# Patient Record
Sex: MALE | Race: WHITE | NOT HISPANIC OR LATINO | Employment: FULL TIME | URBAN - NONMETROPOLITAN AREA
[De-identification: names, ages, dates, MRNs, and addresses within clinical notes are randomized per-mention and may not be internally consistent; named-entity substitution may affect disease eponyms.]

---

## 2023-12-21 ENCOUNTER — HOSPITAL ENCOUNTER (EMERGENCY)
Facility: HOSPITAL | Age: 58
Discharge: ANOTHER HOSPITAL | End: 2023-12-22
Attending: EMERGENCY MEDICINE
Payer: COMMERCIAL

## 2023-12-21 ENCOUNTER — APPOINTMENT (EMERGENCY)
Dept: MRI IMAGING | Facility: HOSPITAL | Age: 58
End: 2023-12-21
Payer: COMMERCIAL

## 2023-12-21 ENCOUNTER — APPOINTMENT (EMERGENCY)
Dept: CT IMAGING | Facility: HOSPITAL | Age: 58
End: 2023-12-21
Payer: COMMERCIAL

## 2023-12-21 ENCOUNTER — APPOINTMENT (EMERGENCY)
Dept: RADIOLOGY | Facility: HOSPITAL | Age: 58
End: 2023-12-21
Payer: COMMERCIAL

## 2023-12-21 DIAGNOSIS — S24.0XXA: ICD-10-CM

## 2023-12-21 DIAGNOSIS — R09.02 HYPOXIA: ICD-10-CM

## 2023-12-21 DIAGNOSIS — R73.9 HYPERGLYCEMIA: ICD-10-CM

## 2023-12-21 DIAGNOSIS — E87.1 HYPONATREMIA: ICD-10-CM

## 2023-12-21 DIAGNOSIS — S06.4XAA EPIDURAL HEMATOMA (HCC): ICD-10-CM

## 2023-12-21 DIAGNOSIS — M79.9 SOFT TISSUE DISORDER: Primary | ICD-10-CM

## 2023-12-21 LAB
2HR DELTA HS TROPONIN: -1 NG/L
ALBUMIN SERPL BCP-MCNC: 2.7 G/DL (ref 3.5–5)
ALP SERPL-CCNC: 204 U/L (ref 34–104)
ALT SERPL W P-5'-P-CCNC: 47 U/L (ref 7–52)
ANION GAP SERPL CALCULATED.3IONS-SCNC: 11 MMOL/L
ANION GAP SERPL CALCULATED.3IONS-SCNC: 17 MMOL/L
APTT PPP: 34 SECONDS (ref 23–37)
AST SERPL W P-5'-P-CCNC: 66 U/L (ref 13–39)
ATRIAL RATE: 100 BPM
BACTERIA UR QL AUTO: ABNORMAL /HPF
BASE EX.OXY STD BLDV CALC-SCNC: 92.4 % (ref 60–80)
BASE EXCESS BLDV CALC-SCNC: -5.7 MMOL/L
BASOPHILS # BLD MANUAL: 0 THOUSAND/UL (ref 0–0.1)
BASOPHILS NFR MAR MANUAL: 0 % (ref 0–1)
BETA-HYDROXYBUTYRATE: 2.4 MMOL/L
BILIRUB SERPL-MCNC: 1.58 MG/DL (ref 0.2–1)
BILIRUB UR QL STRIP: ABNORMAL
BNP SERPL-MCNC: 67 PG/ML (ref 0–100)
BUN SERPL-MCNC: 47 MG/DL (ref 5–25)
BUN SERPL-MCNC: 56 MG/DL (ref 5–25)
BURR CELLS BLD QL SMEAR: PRESENT
CALCIUM ALBUM COR SERPL-MCNC: 9.3 MG/DL (ref 8.3–10.1)
CALCIUM SERPL-MCNC: 7.8 MG/DL (ref 8.4–10.2)
CALCIUM SERPL-MCNC: 8.3 MG/DL (ref 8.4–10.2)
CARDIAC TROPONIN I PNL SERPL HS: 18 NG/L
CARDIAC TROPONIN I PNL SERPL HS: 19 NG/L
CHLORIDE SERPL-SCNC: 88 MMOL/L (ref 96–108)
CHLORIDE SERPL-SCNC: 97 MMOL/L (ref 96–108)
CLARITY UR: CLEAR
CO2 SERPL-SCNC: 18 MMOL/L (ref 21–32)
CO2 SERPL-SCNC: 21 MMOL/L (ref 21–32)
COLOR UR: YELLOW
CREAT SERPL-MCNC: 0.9 MG/DL (ref 0.6–1.3)
CREAT SERPL-MCNC: 1.09 MG/DL (ref 0.6–1.3)
D DIMER PPP FEU-MCNC: 4.66 UG/ML FEU
EOSINOPHIL # BLD MANUAL: 0 THOUSAND/UL (ref 0–0.4)
EOSINOPHIL NFR BLD MANUAL: 0 % (ref 0–6)
ERYTHROCYTE [DISTWIDTH] IN BLOOD BY AUTOMATED COUNT: 14 % (ref 11.6–15.1)
FLUAV RNA RESP QL NAA+PROBE: NEGATIVE
FLUBV RNA RESP QL NAA+PROBE: NEGATIVE
GFR SERPL CREATININE-BSD FRML MDRD: 73 ML/MIN/1.73SQ M
GFR SERPL CREATININE-BSD FRML MDRD: 92 ML/MIN/1.73SQ M
GLUCOSE SERPL-MCNC: 342 MG/DL (ref 65–140)
GLUCOSE SERPL-MCNC: 388 MG/DL (ref 65–140)
GLUCOSE SERPL-MCNC: 436 MG/DL (ref 65–140)
GLUCOSE SERPL-MCNC: 464 MG/DL (ref 65–140)
GLUCOSE SERPL-MCNC: 487 MG/DL (ref 65–140)
GLUCOSE SERPL-MCNC: 497 MG/DL (ref 65–140)
GLUCOSE UR STRIP-MCNC: ABNORMAL MG/DL
HCO3 BLDV-SCNC: 18 MMOL/L (ref 24–30)
HCT VFR BLD AUTO: 34.1 % (ref 36.5–49.3)
HGB BLD-MCNC: 11.2 G/DL (ref 12–17)
HGB UR QL STRIP.AUTO: ABNORMAL
INR PPP: 1.05 (ref 0.84–1.19)
KETONES UR STRIP-MCNC: ABNORMAL MG/DL
LACTATE SERPL-SCNC: 1.8 MMOL/L (ref 0.5–2)
LEUKOCYTE ESTERASE UR QL STRIP: NEGATIVE
LIPASE SERPL-CCNC: 111 U/L (ref 11–82)
LYMPHOCYTES # BLD AUTO: 0.49 THOUSAND/UL (ref 0.6–4.47)
LYMPHOCYTES # BLD AUTO: 4 % (ref 14–44)
MCH RBC QN AUTO: 28.8 PG (ref 26.8–34.3)
MCHC RBC AUTO-ENTMCNC: 32.8 G/DL (ref 31.4–37.4)
MCV RBC AUTO: 88 FL (ref 82–98)
METAMYELOCYTES NFR BLD MANUAL: 1 % (ref 0–1)
MONOCYTES # BLD AUTO: 0.86 THOUSAND/UL (ref 0–1.22)
MONOCYTES NFR BLD: 7 % (ref 4–12)
NEUTROPHILS # BLD MANUAL: 10.78 THOUSAND/UL (ref 1.85–7.62)
NEUTS BAND NFR BLD MANUAL: 9 % (ref 0–8)
NEUTS SEG NFR BLD AUTO: 79 % (ref 43–75)
NITRITE UR QL STRIP: NEGATIVE
NON-SQ EPI CELLS URNS QL MICRO: ABNORMAL /HPF
O2 CT BLDV-SCNC: 14.2 ML/DL
P AXIS: 50 DEGREES
PCO2 BLDV: 29.4 MM HG (ref 42–50)
PH BLDV: 7.4 [PH] (ref 7.3–7.4)
PH UR STRIP.AUTO: 6 [PH]
PLATELET # BLD AUTO: 338 THOUSANDS/UL (ref 149–390)
PLATELET BLD QL SMEAR: ADEQUATE
PMV BLD AUTO: 9.2 FL (ref 8.9–12.7)
PO2 BLDV: 79.4 MM HG (ref 35–45)
POLYCHROMASIA BLD QL SMEAR: PRESENT
POTASSIUM SERPL-SCNC: 4.1 MMOL/L (ref 3.5–5.3)
POTASSIUM SERPL-SCNC: 4.5 MMOL/L (ref 3.5–5.3)
PR INTERVAL: 182 MS
PROT SERPL-MCNC: 6.3 G/DL (ref 6.4–8.4)
PROT UR STRIP-MCNC: ABNORMAL MG/DL
PROTHROMBIN TIME: 14.1 SECONDS (ref 11.6–14.5)
QRS AXIS: 32 DEGREES
QRSD INTERVAL: 112 MS
QT INTERVAL: 362 MS
QTC INTERVAL: 466 MS
RBC # BLD AUTO: 3.89 MILLION/UL (ref 3.88–5.62)
RBC #/AREA URNS AUTO: ABNORMAL /HPF
RBC MORPH BLD: PRESENT
RSV RNA RESP QL NAA+PROBE: NEGATIVE
SARS-COV-2 RNA RESP QL NAA+PROBE: NEGATIVE
SODIUM SERPL-SCNC: 123 MMOL/L (ref 135–147)
SODIUM SERPL-SCNC: 129 MMOL/L (ref 135–147)
SP GR UR STRIP.AUTO: 1.01 (ref 1–1.03)
T WAVE AXIS: 30 DEGREES
UROBILINOGEN UR QL STRIP.AUTO: 1 E.U./DL
VENTRICULAR RATE: 100 BPM
WBC # BLD AUTO: 12.25 THOUSAND/UL (ref 4.31–10.16)
WBC #/AREA URNS AUTO: ABNORMAL /HPF
WBC TOXIC VACUOLES BLD QL SMEAR: PRESENT

## 2023-12-21 PROCEDURE — 74177 CT ABD & PELVIS W/CONTRAST: CPT

## 2023-12-21 PROCEDURE — 83880 ASSAY OF NATRIURETIC PEPTIDE: CPT | Performed by: EMERGENCY MEDICINE

## 2023-12-21 PROCEDURE — 82948 REAGENT STRIP/BLOOD GLUCOSE: CPT

## 2023-12-21 PROCEDURE — 85610 PROTHROMBIN TIME: CPT | Performed by: EMERGENCY MEDICINE

## 2023-12-21 PROCEDURE — 87040 BLOOD CULTURE FOR BACTERIA: CPT | Performed by: EMERGENCY MEDICINE

## 2023-12-21 PROCEDURE — 87147 CULTURE TYPE IMMUNOLOGIC: CPT | Performed by: EMERGENCY MEDICINE

## 2023-12-21 PROCEDURE — 83605 ASSAY OF LACTIC ACID: CPT | Performed by: EMERGENCY MEDICINE

## 2023-12-21 PROCEDURE — 93005 ELECTROCARDIOGRAM TRACING: CPT

## 2023-12-21 PROCEDURE — 72146 MRI CHEST SPINE W/O DYE: CPT

## 2023-12-21 PROCEDURE — 81001 URINALYSIS AUTO W/SCOPE: CPT | Performed by: EMERGENCY MEDICINE

## 2023-12-21 PROCEDURE — G1004 CDSM NDSC: HCPCS

## 2023-12-21 PROCEDURE — 85027 COMPLETE CBC AUTOMATED: CPT | Performed by: EMERGENCY MEDICINE

## 2023-12-21 PROCEDURE — 82010 KETONE BODYS QUAN: CPT | Performed by: EMERGENCY MEDICINE

## 2023-12-21 PROCEDURE — 71275 CT ANGIOGRAPHY CHEST: CPT

## 2023-12-21 PROCEDURE — 85730 THROMBOPLASTIN TIME PARTIAL: CPT | Performed by: EMERGENCY MEDICINE

## 2023-12-21 PROCEDURE — 80048 BASIC METABOLIC PNL TOTAL CA: CPT | Performed by: EMERGENCY MEDICINE

## 2023-12-21 PROCEDURE — 0241U HB NFCT DS VIR RESP RNA 4 TRGT: CPT | Performed by: EMERGENCY MEDICINE

## 2023-12-21 PROCEDURE — 85379 FIBRIN DEGRADATION QUANT: CPT | Performed by: EMERGENCY MEDICINE

## 2023-12-21 PROCEDURE — 80053 COMPREHEN METABOLIC PANEL: CPT | Performed by: EMERGENCY MEDICINE

## 2023-12-21 PROCEDURE — 82805 BLOOD GASES W/O2 SATURATION: CPT | Performed by: EMERGENCY MEDICINE

## 2023-12-21 PROCEDURE — 84484 ASSAY OF TROPONIN QUANT: CPT | Performed by: EMERGENCY MEDICINE

## 2023-12-21 PROCEDURE — 72148 MRI LUMBAR SPINE W/O DYE: CPT

## 2023-12-21 PROCEDURE — 87186 SC STD MICRODIL/AGAR DIL: CPT | Performed by: EMERGENCY MEDICINE

## 2023-12-21 PROCEDURE — 87154 CUL TYP ID BLD PTHGN 6+ TRGT: CPT | Performed by: EMERGENCY MEDICINE

## 2023-12-21 PROCEDURE — 85007 BL SMEAR W/DIFF WBC COUNT: CPT | Performed by: EMERGENCY MEDICINE

## 2023-12-21 PROCEDURE — 36415 COLL VENOUS BLD VENIPUNCTURE: CPT | Performed by: EMERGENCY MEDICINE

## 2023-12-21 PROCEDURE — 83690 ASSAY OF LIPASE: CPT | Performed by: EMERGENCY MEDICINE

## 2023-12-21 RX ORDER — CEFTRIAXONE 1 G/50ML
1000 INJECTION, SOLUTION INTRAVENOUS ONCE
Status: COMPLETED | OUTPATIENT
Start: 2023-12-21 | End: 2023-12-21

## 2023-12-21 RX ORDER — HYDROMORPHONE HCL/PF 1 MG/ML
0.5 SYRINGE (ML) INJECTION ONCE
Status: COMPLETED | OUTPATIENT
Start: 2023-12-21 | End: 2023-12-21

## 2023-12-21 RX ORDER — SODIUM CHLORIDE 9 MG/ML
150 INJECTION, SOLUTION INTRAVENOUS CONTINUOUS
Status: DISCONTINUED | OUTPATIENT
Start: 2023-12-21 | End: 2023-12-22 | Stop reason: HOSPADM

## 2023-12-21 RX ORDER — HYDROMORPHONE HCL/PF 1 MG/ML
1 SYRINGE (ML) INJECTION ONCE
Status: COMPLETED | OUTPATIENT
Start: 2023-12-21 | End: 2023-12-21

## 2023-12-21 RX ORDER — FENTANYL CITRATE 50 UG/ML
50 INJECTION, SOLUTION INTRAMUSCULAR; INTRAVENOUS ONCE
Status: COMPLETED | OUTPATIENT
Start: 2023-12-21 | End: 2023-12-21

## 2023-12-21 RX ORDER — MORPHINE SULFATE 4 MG/ML
4 INJECTION, SOLUTION INTRAMUSCULAR; INTRAVENOUS ONCE
Status: COMPLETED | OUTPATIENT
Start: 2023-12-21 | End: 2023-12-21

## 2023-12-21 RX ORDER — HYDROMORPHONE HCL/PF 1 MG/ML
0.5 SYRINGE (ML) INJECTION ONCE
Status: DISCONTINUED | OUTPATIENT
Start: 2023-12-21 | End: 2023-12-21

## 2023-12-21 RX ORDER — ACETAMINOPHEN 325 MG/1
975 TABLET ORAL ONCE
Status: COMPLETED | OUTPATIENT
Start: 2023-12-21 | End: 2023-12-21

## 2023-12-21 RX ADMIN — MORPHINE SULFATE 2 MG: 2 INJECTION, SOLUTION INTRAMUSCULAR; INTRAVENOUS at 12:02

## 2023-12-21 RX ADMIN — ACETAMINOPHEN 975 MG: 325 TABLET, FILM COATED ORAL at 17:26

## 2023-12-21 RX ADMIN — CEFTRIAXONE 1000 MG: 1 INJECTION, SOLUTION INTRAVENOUS at 09:22

## 2023-12-21 RX ADMIN — VANCOMYCIN HYDROCHLORIDE 1750 MG: 1 INJECTION, POWDER, LYOPHILIZED, FOR SOLUTION INTRAVENOUS at 12:53

## 2023-12-21 RX ADMIN — INSULIN HUMAN 8 UNITS: 100 INJECTION, SOLUTION PARENTERAL at 14:54

## 2023-12-21 RX ADMIN — INSULIN HUMAN 8 UNITS: 100 INJECTION, SOLUTION PARENTERAL at 09:21

## 2023-12-21 RX ADMIN — SODIUM CHLORIDE 2000 ML: 0.9 INJECTION, SOLUTION INTRAVENOUS at 08:41

## 2023-12-21 RX ADMIN — PIPERACILLIN AND TAZOBACTAM 3.38 G: 36; 4.5 INJECTION, POWDER, FOR SOLUTION INTRAVENOUS at 11:51

## 2023-12-21 RX ADMIN — DESMOPRESSIN ACETATE 37.2 MCG: 4 SOLUTION INTRAVENOUS at 16:05

## 2023-12-21 RX ADMIN — IOHEXOL 100 ML: 350 INJECTION, SOLUTION INTRAVENOUS at 09:58

## 2023-12-21 RX ADMIN — PIPERACILLIN AND TAZOBACTAM 4.5 G: 36; 4.5 INJECTION, POWDER, FOR SOLUTION INTRAVENOUS at 20:05

## 2023-12-21 RX ADMIN — PIPERACILLIN AND TAZOBACTAM 4.5 G: 36; 4.5 INJECTION, POWDER, FOR SOLUTION INTRAVENOUS at 23:36

## 2023-12-21 RX ADMIN — HYDROMORPHONE HYDROCHLORIDE 0.5 MG: 1 INJECTION, SOLUTION INTRAMUSCULAR; INTRAVENOUS; SUBCUTANEOUS at 16:05

## 2023-12-21 RX ADMIN — SODIUM CHLORIDE 150 ML/HR: 0.9 INJECTION, SOLUTION INTRAVENOUS at 17:28

## 2023-12-21 RX ADMIN — SODIUM CHLORIDE 1000 ML: 0.9 INJECTION, SOLUTION INTRAVENOUS at 14:54

## 2023-12-21 RX ADMIN — FENTANYL CITRATE 50 MCG: 50 INJECTION INTRAMUSCULAR; INTRAVENOUS at 23:42

## 2023-12-21 RX ADMIN — VANCOMYCIN HYDROCHLORIDE 1250 MG: 5 INJECTION, POWDER, LYOPHILIZED, FOR SOLUTION INTRAVENOUS at 20:05

## 2023-12-21 RX ADMIN — HYDROMORPHONE HYDROCHLORIDE 1 MG: 1 INJECTION, SOLUTION INTRAMUSCULAR; INTRAVENOUS; SUBCUTANEOUS at 14:22

## 2023-12-21 RX ADMIN — MORPHINE SULFATE 4 MG: 4 INJECTION INTRAVENOUS at 09:01

## 2023-12-21 NOTE — ED NOTES
Pts oldest daughter states that father started w/ hallucinations yesterday 12-20 pt stated he was seeing aliens dancing on the ceiling and the hose was moving around on daughters car why she was driving. (That never happened daughter states)     Karen Choe  12/21/23 1037

## 2023-12-21 NOTE — ED NOTES
"Patient told PCA they were \"burning up.\" PCA took patients temperature and offered patient cool rag. Provider made aware.      Gracia Cates RN  12/21/23 5032    "

## 2023-12-21 NOTE — EMTALA/ACUTE CARE TRANSFER
[facility] EMERGENCY DEPARTMENT  [address] [facility] [address]  Dept: [phone]      EMTALA TRANSFER CONSENT    NAME Ramón Tang                                         1965                              MRN [id number]    I have been informed of my rights regarding examination, treatment, and transfer   by Dr. Krish Locke MD    Benefits: Specialized equipment and/or services available at the receiving facility (Include comment)________________________    Risks: Potential for delay in receiving treatment, Potential deterioration of medical condition, Loss of IV, Possible worsening of condition or death during transfer, Increased discomfort during transfer      Consent for Transfer:  I acknowledge that my medical condition has been evaluated and explained to me by the emergency department physician or other qualified medical person and/or my attending physician, who has recommended that I be transferred to the service of  Accepting Physician: Claudia Yu at Accepting Facility Name, City & State : [facility]. The above potential benefits of such transfer, the potential risks associated with such transfer, and the probable risks of not being transferred have been explained to me, and I fully understand them.  The doctor has explained that, in my case, the benefits of transfer outweigh the risks.  I agree to be transferred.    I authorize the performance of emergency medical procedures and treatments upon me in both transit and upon arrival at the receiving facility.  Additionally, I authorize the release of any and all medical records to the receiving facility and request they be transported with me, if possible.  I understand that the safest mode of transportation during a medical emergency is an ambulance and that the Hospital advocates the use of this mode of transport. Risks of traveling to the receiving facility by car, including absence of medical control, life sustaining  equipment, such as oxygen, and medical personnel has been explained to me and I fully understand them.    (CARMEN CORRECT BOX BELOW)  [  ]  I consent to the stated transfer and to be transported by ambulance/helicopter.  [  ]  I consent to the stated transfer, but refuse transportation by ambulance and accept full responsibility for my transportation by car.  I understand the risks of non-ambulance transfers and I exonerate the Hospital and its staff from any deterioration in my condition that results from this refusal.    X___________________________________________    DATE  23  TIME________  Signature of patient or legally responsible individual signing on patient behalf           RELATIONSHIP TO PATIENT_________________________          Provider Certification    NAME Ramón Tang                                         1965                              MRN [id number]    A medical screening exam was performed on the above named patient.  Based on the examination:    Condition Necessitating Transfer The primary encounter diagnosis was Hyperglycemia. Diagnoses of Hyponatremia, Hypoxia, and Traumatic edema of thoracic spinal cord (HCC) were also pertinent to this visit.    Patient Condition: The patient has been stabilized such that within reasonable medical probability, no material deterioration of the patient condition or the condition of the unborn child(william) is likely to result from the transfer    Reason for Transfer: Level of Care needed not available at this facility    [place] Requirements[place] [facility]   Space available and qualified personnel available for treatment as acknowledged by Lisbet at [facility]  Agreed to accept transfer and to provide appropriate medical treatment as acknowledged by       Claudia Yu  Appropriate medical records of the examination and treatment of the patient are provided at the time of transfer   STAFF INITIAL WHEN COMPLETED _______  Transfer will be performed by  qualified personnel from    and appropriate transfer equipment as required, including the use of necessary and appropriate life support measures.    Provider Certification: I have examined the patient and explained the following risks and benefits of being transferred/refusing transfer to the patient/family:  General risk, such as traffic hazards, adverse weather conditions, rough terrain or turbulence, possible failure of equipment (including vehicle or aircraft), or consequences of actions of persons outside the control of the transport personnel, Unanticipated needs of medical equipment and personnel during transport, Risk of worsening condition      Based on these reasonable risks and benefits to the patient and/or the unborn child(william), and based upon the information available at the time of the patient’s examination, I certify that the medical benefits reasonably to be expected from the provision of appropriate medical treatments at another medical facility outweigh the increasing risks, if any, to the individual’s medical condition, and in the case of labor to the unborn child, from effecting the transfer.    X____________________________________________ DATE 12/21/23        TIME_______      ORIGINAL - SEND TO MEDICAL RECORDS   COPY - SEND WITH PATIENT DURING TRANSFER

## 2023-12-21 NOTE — ED PROVIDER NOTES
History  Chief Complaint   Patient presents with    Shoulder Pain     Patient fell 2 weeks ago and has been having progressively worsening right shoulder pain.  Feels slightly short of breath.  Complains of upper back and lower back pain.  Was seen by family doctor yesterday and placed on anti-inflammatory medicine and muscle relaxer.  Not any better.  No history of CHF or COPD.  No recent cough or cold symptoms.  Does complain of increasing leg swelling.      History provided by:  Patient   used: No    Shoulder Pain  Location:  Shoulder  Shoulder location:  R shoulder  Injury: yes    Mechanism of injury: fall    Fall:     Fall occurred:  Standing    Point of impact: Right shoulder.    Entrapped after fall: no    Pain details:     Quality:  Aching    Radiates to:  Does not radiate    Severity:  Moderate    Onset quality:  Gradual    Duration:  2 weeks    Timing:  Constant    Progression:  Worsening  Relieved by:  Nothing  Worsened by:  Movement  Ineffective treatments:  NSAIDs  Associated symptoms: back pain and swelling    Associated symptoms: no fever, no neck pain and no numbness        None       History reviewed. No pertinent past medical history.    History reviewed. No pertinent surgical history.    History reviewed. No pertinent family history.  I have reviewed and agree with the history as documented.    E-Cigarette/Vaping     E-Cigarette/Vaping Substances          Review of Systems   Constitutional:  Negative for chills and fever.   HENT:  Negative for ear pain, hearing loss, sore throat, trouble swallowing and voice change.    Eyes:  Negative for pain and discharge.   Respiratory:  Negative for cough, shortness of breath and wheezing.    Cardiovascular:  Negative for chest pain and palpitations.   Gastrointestinal:  Negative for abdominal pain, blood in stool, constipation, diarrhea, nausea and vomiting.   Genitourinary:  Negative for dysuria, flank pain, frequency and hematuria.    Musculoskeletal:  Positive for back pain. Negative for joint swelling, neck pain and neck stiffness.   Skin:  Negative for rash and wound.   Neurological:  Negative for dizziness, seizures, syncope, facial asymmetry and headaches.   Psychiatric/Behavioral:  Negative for hallucinations, self-injury and suicidal ideas.    All other systems reviewed and are negative.      Physical Exam  Physical Exam  Vitals and nursing note reviewed.   Constitutional:       General: He is not in acute distress.     Appearance: He is well-developed.   HENT:      Head: Normocephalic and atraumatic.      Right Ear: External ear normal.      Left Ear: External ear normal.   Eyes:      General: No scleral icterus.        Right eye: No discharge.         Left eye: No discharge.      Extraocular Movements: Extraocular movements intact.      Conjunctiva/sclera: Conjunctivae normal.   Cardiovascular:      Rate and Rhythm: Normal rate and regular rhythm.      Heart sounds: Normal heart sounds. No murmur heard.  Pulmonary:      Breath sounds: No wheezing.      Comments: Slightly tachypneic.  Crackles at bases.  Abdominal:      General: Bowel sounds are normal. There is no distension.      Palpations: Abdomen is soft.      Tenderness: There is no abdominal tenderness. There is no guarding or rebound.   Musculoskeletal:         General: No deformity.      Cervical back: Normal range of motion and neck supple.      Right lower leg: Edema present.      Left lower leg: Edema present.      Comments: Right shoulder with decreased range of motion.  Radial pulse 2+.   Skin:     General: Skin is warm and dry.      Findings: No rash.   Neurological:      General: No focal deficit present.      Mental Status: He is alert and oriented to person, place, and time.      Cranial Nerves: No cranial nerve deficit.   Psychiatric:         Mood and Affect: Mood normal.         Behavior: Behavior normal.         Thought Content: Thought content normal.          Judgment: Judgment normal.         Vital Signs  ED Triage Vitals [12/21/23 0812]   Temperature Pulse Respirations Blood Pressure SpO2   98.2 °F (36.8 °C) 90 22 137/70 (!) 88 %      Temp src Heart Rate Source Patient Position - Orthostatic VS BP Location FiO2 (%)   -- Monitor Lying Left arm --      Pain Score       8           Vitals:    12/21/23 1230 12/21/23 1245 12/21/23 1300 12/21/23 1315   BP: 96/63 100/66 113/81 94/62   Pulse: (!) 107 (!) 106 (!) 107 105   Patient Position - Orthostatic VS:    Lying         Visual Acuity      ED Medications  Medications   vancomycin (VANCOCIN) 1750 mg in sodium chloride 0.9% 500 mL IVPB ( Intravenous Restarted 12/21/23 1429)   morphine injection 4 mg (4 mg Intravenous Given 12/21/23 0901)   sodium chloride 0.9 % bolus 2,500 mL (0 mL Intravenous Stopped 12/21/23 1142)   cefTRIAXone (ROCEPHIN) IVPB (premix in dextrose) 1,000 mg 50 mL (0 mg Intravenous Stopped 12/21/23 0927)   insulin regular (HumuLIN R,NovoLIN R) injection 8 Units (8 Units Subcutaneous Given 12/21/23 0921)   iohexol (OMNIPAQUE) 350 MG/ML injection (MULTI-DOSE) 100 mL (100 mL Intravenous Given 12/21/23 0958)   piperacillin-tazobactam (ZOSYN) 3.375 g in sodium chloride 0.9 % 100 mL IVPB (0 g Intravenous Stopped 12/21/23 1221)   morphine injection 2 mg (2 mg Intravenous Given 12/21/23 1202)   HYDROmorphone (DILAUDID) injection 1 mg (1 mg Intravenous Given 12/21/23 1422)       Diagnostic Studies  Results Reviewed       Procedure Component Value Units Date/Time    Urine Microscopic [[id number]]  (Abnormal) Collected: 12/21/23 1047    Lab Status: Final result Specimen: Urine, Clean Catch Updated: 12/21/23 1118     RBC, UA 4-10 /hpf      WBC, UA 2-4 /hpf      Epithelial Cells Occasional /hpf      Bacteria, UA Moderate /hpf     HS Troponin I 2hr [[id number]]  (Normal) Collected: 12/21/23 1039    Lab Status: Final result Specimen: Blood from Arm, Left Updated: 12/21/23 1116     hs TnI 2hr 18 ng/L      Delta 2hr hsTnI -1  ng/L     UA w Reflex to Microscopic w Reflex to Culture [[id number]]  (Abnormal) Collected: 12/21/23 1047    Lab Status: Final result Specimen: Urine, Clean Catch Updated: 12/21/23 1104     Color, UA Yellow     Clarity, UA Clear     Specific Gravity, UA 1.015     pH, UA 6.0     Leukocytes, UA Negative     Nitrite, UA Negative     Protein, UA 30 (1+) mg/dl      Glucose,  (1/2%) mg/dl      Ketones, UA Trace mg/dl      Urobilinogen, UA 1.0 E.U./dl      Bilirubin, UA Small     Occult Blood, UA Large    Fingerstick Glucose (POCT) [[id number]]  (Abnormal) Collected: 12/21/23 1031    Lab Status: Final result Updated: 12/21/23 1047     POC Glucose 464 mg/dl     HS Troponin I 4hr [[id number]]     Lab Status: No result Specimen: Blood     Beta Hydroxybutyrate [[id number]]  (Abnormal) Collected: 12/21/23 0927    Lab Status: Final result Specimen: Blood from Arm, Right Updated: 12/21/23 0942     BETA-HYDROXYBUTYRATE 2.4 mmol/L     Blood gas, venous [[id number]]  (Abnormal) Collected: 12/21/23 0927    Lab Status: Final result Specimen: Blood from Arm, Right Updated: 12/21/23 0942     pH, Omi 7.404     pCO2, Omi 29.4 mm Hg      pO2, Omi 79.4 mm Hg      HCO3, Omi 18.0 mmol/L      Base Excess, Omi -5.7 mmol/L      O2 Content, Omi 14.2 ml/dL      O2 HGB, VENOUS 92.4 %     FLU/RSV/COVID - if FLU/RSV clinically relevant [[id number]]  (Normal) Collected: 12/21/23 0822    Lab Status: Final result Specimen: Nares from Nose Updated: 12/21/23 0915     SARS-CoV-2 Negative     INFLUENZA A PCR Negative     INFLUENZA B PCR Negative     RSV PCR Negative    Narrative:      FOR PEDIATRIC PATIENTS - copy/paste COVID Guidelines URL to browser: [email or web address]    SARS-CoV-2 assay is a Nucleic Acid Amplification assay intended for the  qualitative detection of nucleic acid from SARS-CoV-2 in nasopharyngeal  swabs. Results are for the presumptive identification of SARS-CoV-2 RNA.    Positive  results are indicative of infection with SARS-CoV-2, the virus  causing COVID-19, but do not rule out bacterial infection or co-infection  with other viruses. Laboratories within the [place] and its  territories are required to report all positive results to the appropriate  public health authorities. Negative results do not preclude SARS-CoV-2  infection and should not be used as the sole basis for treatment or other  patient management decisions. Negative results must be combined with  clinical observations, patient history, and epidemiological information.  This test has not been FDA cleared or approved.    This test has been authorized by FDA under an Emergency Use Authorization  (EUA). This test is only authorized for the duration of time the  declaration that circumstances exist justifying the authorization of the  emergency use of an in vitro diagnostic tests for detection of SARS-CoV-2  virus and/or diagnosis of COVID-19 infection under section 564(b)(1) of  the Act, 21 U.S.C. 360bbb-3(b)(1), unless the authorization is terminated  or revoked sooner. The test has been validated but independent review by FDA  and CLIA is pending.    Test performed using [employer] GeneXpert: This RT-PCR assay targets N2,  a region unique to SARS-CoV-2. A conserved region in the E-gene was chosen  for pan-Sarbecovirus detection which includes SARS-CoV-2.    According to CMS-2020-01-R, this platform meets the definition of high-throughput technology.    B-Type Natriuretic Peptide(BNP) [[id number]]  (Normal) Collected: 12/21/23 0822    Lab Status: Final result Specimen: Blood from Arm, Left Updated: 12/21/23 0914     BNP 67 pg/mL     Manual Differential(PHLEBS Do Not Order) [[id number]]  (Abnormal) Collected: 12/21/23 0822    Lab Status: Edited Result - FINAL Specimen: Blood from Arm, Left Updated: 12/21/23 0910     Segmented % 79 %      Bands % 9 %      Lymphocytes % 4 %      Monocytes % 7 %      Eosinophils, % 0 %       Basophils % 0 %      Metamyelocytes% 1 %      Absolute Neutrophils 10.78 Thousand/uL      Lymphocytes Absolute 0.49 Thousand/uL      Monocytes Absolute 0.86 Thousand/uL      Eosinophils Absolute 0.00 Thousand/uL      Basophils Absolute 0.00 Thousand/uL      Total Counted --     Toxic Vacuolated Neutrophils Present     RBC Morphology Present     Platelet Estimate Adequate     Jelly Cells Present     Polychromasia Present    Blood culture #1 [[id number]] Collected: 12/21/23 0904    Lab Status: In process Specimen: Blood from Arm, Right Updated: 12/21/23 0907    D-Dimer [[id number]]  (Abnormal) Collected: 12/21/23 0822    Lab Status: Final result Specimen: Blood from Arm, Left Updated: 12/21/23 0856     D-Dimer, Quant 4.66 ug/ml FEU     Narrative:      In the evaluation for possible pulmonary embolism, in the appropriate (Well's Score of 4 or less) patient, the age adjusted d-dimer cutoff for this patient can be calculated as:    Age x 0.01 (in ug/mL) for Age-adjusted D-dimer exclusion threshold for a patient over 50 years.    HS Troponin 0hr (reflex protocol) [[id number]]  (Normal) Collected: 12/21/23 0822    Lab Status: Final result Specimen: Blood from Arm, Left Updated: 12/21/23 0853     hs TnI 0hr 19 ng/L     Blood culture #2 [[id number]] Collected: 12/21/23 0845    Lab Status: In process Specimen: Blood from Arm, Left Updated: 12/21/23 0847    Comprehensive metabolic panel [[id number]]  (Abnormal) Collected: 12/21/23 0822    Lab Status: Final result Specimen: Blood from Arm, Left Updated: 12/21/23 0846     Sodium 123 mmol/L      Potassium 4.5 mmol/L      Chloride 88 mmol/L      CO2 18 mmol/L      ANION GAP 17 mmol/L      BUN 56 mg/dL      Creatinine 1.09 mg/dL      Glucose 487 mg/dL      Calcium 8.3 mg/dL      Corrected Calcium 9.3 mg/dL      AST 66 U/L      ALT 47 U/L      Alkaline Phosphatase 204 U/L      Total Protein 6.3 g/dL      Albumin 2.7 g/dL      Total Bilirubin 1.58 mg/dL      eGFR 73 ml/min/1.73sq m      Narrative:      National Kidney Disease Foundation guidelines for Chronic Kidney Disease (CKD):     Stage 1 with normal or high GFR (GFR > 90 mL/min/1.73 square meters)    Stage 2 Mild CKD (GFR = 60-89 mL/min/1.73 square meters)    Stage 3A Moderate CKD (GFR = 45-59 mL/min/1.73 square meters)    Stage 3B Moderate CKD (GFR = 30-44 mL/min/1.73 square meters)    Stage 4 Severe CKD (GFR = 15-29 mL/min/1.73 square meters)    Stage 5 End Stage CKD (GFR <15 mL/min/1.73 square meters)  Note: GFR calculation is accurate only with a steady state creatinine    Lipase [[id number]]  (Abnormal) Collected: 12/21/23 0822    Lab Status: Final result Specimen: Blood from Arm, Left Updated: 12/21/23 0846     Lipase 111 u/L     Lactic acid, plasma (w/reflex if result > 2.0) [[id number]]  (Normal) Collected: 12/21/23 0822    Lab Status: Final result Specimen: Blood from Arm, Left Updated: 12/21/23 0846     LACTIC ACID 1.8 mmol/L     Narrative:      Result may be elevated if tourniquet was used during collection.    Protime-INR [[id number]]  (Normal) Collected: 12/21/23 0822    Lab Status: Final result Specimen: Blood from Arm, Left Updated: 12/21/23 0841     Protime 14.1 seconds      INR 1.05    APTT [[id number]]  (Normal) Collected: 12/21/23 0822    Lab Status: Final result Specimen: Blood from Arm, Left Updated: 12/21/23 0841     PTT 34 seconds     CBC and differential [[id number]]  (Abnormal) Collected: 12/21/23 0822    Lab Status: Final result Specimen: Blood from Arm, Left Updated: 12/21/23 0836     WBC 12.25 Thousand/uL      RBC 3.89 Million/uL      Hemoglobin 11.2 g/dL      Hematocrit 34.1 %      MCV 88 fL      MCH 28.8 pg      MCHC 32.8 g/dL      RDW 14.0 %      MPV 9.2 fL      Platelets 338 Thousands/uL     Fingerstick Glucose (POCT) [[id number]]  (Abnormal) Collected: 12/21/23 0818    Lab Status: Final result Updated: 12/21/23 0826     POC Glucose 497 mg/dl                    CT recon only thoracolumbar   Final Result by Dao  Sabina Enciso MD (12/21 1138)      Age-indeterminate fracture of left T10-T11 paravertebral osteophyte.      Soft tissue density in anterior paravertebral space at T10, T11, and T12. Differential includes infection/inflammation, paraspinal hematoma, radha conglomerate, among other differentials. Consider MRI thoracic spine with and without contrast for further    evaluation.      Please see same-day CTA chest PE study abdomen pelvis with contrast for further evaluation of additional findings in the chest, abdomen, and pelvis.      Additional chronic/incidental findings as detailed above.      The study was marked in EPIC for immediate notification.                     Workstation performed: [id number]         PE Study with CT Abdomen and Pelvis with contrast   Final Result by Lora Estrada MD (12/21 1136)      No evidence for pulmonary embolism.      8.6 x 2.8 cm focal region of mixed density anterior paravertebral edema/hemorrhage extending from the level of T10-T12 with probable fracture within the anterior bridging osteophyte at the level of T11 and mild associated widening of the intervertebral    disc space anteriorly at T10-T11. Given the history of sepsis, discitis-osteomyelitis is not excluded however is considered less likely given absence of other acute vertebral body endplate changes. No rim-enhancing collection to suggest a paravertebral    abscess. Dedicated thoracic and lumbar spine MRI without and with contrast is advised for further evaluation.      Multiple foci of air and associated fluid/edema in the bilateral adductor musculature in the pelvis suspicious for incompletely imaged intramuscular collections/abscesses. These can be further evaluated with contrast-enhanced pelvic MRI.      Hepatosplenomegaly. Hepatic steatosis.      Several pulmonary nodules measuring up to 14 mm in the left lower lobe. These are indeterminate. Nonemergent PET/CT correlation should be considered.      Small right  pleural effusion.      I personally discussed this study with DR. KRISH LOCKE on 12/21/2023 11:05 AM.                           Workstation performed: [id number]         MRI thoracic spine wo contrast    (Results Pending)   MRI lumbar spine wo contrast    (Results Pending)              Procedures  ECG 12 Lead Documentation Only    Date/Time: 12/21/2023 8:22 AM    Performed by: Krish Locke MD  Authorized by: Krish Locke MD    ECG reviewed by me, the ED Provider: yes    Patient location:  ED  Rate:     ECG rate:  100  Rhythm:     Rhythm: sinus rhythm    Ectopy:     Ectopy: none    QRS:     QRS axis:  Normal           ED Course  ED Course as of 12/21/23 1433   Thu Dec 21, 2023   0828 BMI greater than 30.  Will use the ideal body weight for the IV fluid bolus.   0904 X-ray of shoulder done yesterday shows no fracture or dislocation.   0906 Patient did not take his diabetic medications today.   1103 Discussed with radiology.  They seem to think that the edema and hemorrhage along T12 and the bridging osteophyte is more likely from trauma as opposed to infection.  Will start on antibiotics.  Discussed with patient.  Would need trauma possibly for his multiple injuries.  Would like to be transferred to [facility].   1157 Trauma alert not called as injury happened 2 weeks ago.   1217 Sepsis reassessment.  Patient seen.  Feeling better after IV morphine.  Radial pulses 2+.  Cap refill less than 2 seconds.  Systolic blood pressure hanging out in the 100s.   1305 Patient drops his blood pressure into the 90s.  Each time this occurred it happened after morphine was given.  Very likely that the low blood pressure is caused by the morphine and not an infectious or septic process.                                             Medical Decision Making  Amount and/or Complexity of Data Reviewed  Labs: ordered. Decision-making details documented in ED Course.  Radiology: ordered and independent interpretation  performed. Decision-making details documented in ED Course.  Discussion of management or test interpretation with external provider(s): Differential diagnosis includes but not limited to STEMI, NSTEMI, PE, pneumonia, pneumothorax, musculoskeletal chest pain, costochondritis, gastritis, cholelithiasis, contusion, strain also within the differential is shoulder injury, shoulder strain, rotator cuff injury, back strain    Risk  OTC drugs.  Prescription drug management.             Disposition  Final diagnoses:   Hyperglycemia   Hyponatremia   Hypoxia   Traumatic edema of thoracic spinal cord (HCC)     Time reflects when diagnosis was documented in both MDM as applicable and the Disposition within this note       Time User Action Codes Description Comment    12/21/2023  9:06 AM Mirtha Lockeo P Add [R73.9] Hyperglycemia     12/21/2023  9:06 AM Visperas Krish P Add [E87.1] Hyponatremia     12/21/2023 10:49 AM Visperas Krish P Add [R09.02] Hypoxia     12/21/2023 11:04 AM Visperas Krish P Add [S24.0XXA] Traumatic edema of thoracic spinal cord (HCC)           ED Disposition       ED Disposition   Transfer to Another Facility-In Network    Condition   --    Date/Time   Thu Dec 21, 2023 11:05 AM    Comment   Ramón [place] [facility] [place]               MD Documentation      Flowsheet Row Most Recent Value   Patient Condition The patient has been stabilized such that within reasonable medical probability, no material deterioration of the patient condition or the condition of the unborn child(william) is likely to result from the transfer   Reason for Transfer Level of Care needed not available at this facility   Benefits of Transfer Specialized equipment and/or services available at the receiving facility (Include comment)________________________   Risks of Transfer Potential for delay in receiving treatment, Potential deterioration of medical condition, Loss of IV, Possible worsening of condition or  death during transfer, Increased discomfort during transfer   Accepting Physician Claudia Yu   Accepting Facility Name, [facility]    (Name & Tel number) [facility]   Sending MD Locke   Provider Certification General risk, such as traffic hazards, adverse weather conditions, rough terrain or turbulence, possible failure of equipment (including vehicle or aircraft), or consequences of actions of persons outside the control of the transport personnel, Unanticipated needs of medical equipment and personnel during transport, Risk of worsening condition          RN Documentation      Flowsheet Row Most Recent Value   Accepting Facility Name, [facility]    (Name & Tel number) [facility]          Follow-up Information    None         Patient's Medications    No medications on file       No discharge procedures on file.    PDMP Review       None            ED Provider  Electronically Signed by             Krish Locke MD  12/21/23 5928

## 2023-12-21 NOTE — ED TRIAGE NOTES
Patient here from home complaining of pain in his right shoulder that began two weeks ago due to fall. Pt states that he was seen at [facility], but pain was not relieved. Denies chest pain. Alert and oriented upon arrival.

## 2023-12-22 VITALS
TEMPERATURE: 97.5 F | RESPIRATION RATE: 24 BRPM | HEIGHT: 72 IN | HEART RATE: 96 BPM | BODY MASS INDEX: 36.97 KG/M2 | OXYGEN SATURATION: 96 % | SYSTOLIC BLOOD PRESSURE: 149 MMHG | DIASTOLIC BLOOD PRESSURE: 67 MMHG | WEIGHT: 272.93 LBS

## 2023-12-22 LAB
ANION GAP SERPL CALCULATED.3IONS-SCNC: 13 MMOL/L
BUN SERPL-MCNC: 46 MG/DL (ref 5–25)
CALCIUM SERPL-MCNC: 8.3 MG/DL (ref 8.4–10.2)
CHLORIDE SERPL-SCNC: 100 MMOL/L (ref 96–108)
CO2 SERPL-SCNC: 20 MMOL/L (ref 21–32)
CREAT SERPL-MCNC: 0.84 MG/DL (ref 0.6–1.3)
ERYTHROCYTE [DISTWIDTH] IN BLOOD BY AUTOMATED COUNT: 14.3 % (ref 11.6–15.1)
GFR SERPL CREATININE-BSD FRML MDRD: 95 ML/MIN/1.73SQ M
GLUCOSE SERPL-MCNC: 348 MG/DL (ref 65–140)
HCT VFR BLD AUTO: 32.8 % (ref 36.5–49.3)
HGB BLD-MCNC: 10.6 G/DL (ref 12–17)
MCH RBC QN AUTO: 29.2 PG (ref 26.8–34.3)
MCHC RBC AUTO-ENTMCNC: 32.3 G/DL (ref 31.4–37.4)
MCV RBC AUTO: 90 FL (ref 82–98)
PLATELET # BLD AUTO: 314 THOUSANDS/UL (ref 149–390)
PMV BLD AUTO: 9.1 FL (ref 8.9–12.7)
POTASSIUM SERPL-SCNC: 4.3 MMOL/L (ref 3.5–5.3)
RBC # BLD AUTO: 3.63 MILLION/UL (ref 3.88–5.62)
SODIUM SERPL-SCNC: 133 MMOL/L (ref 135–147)
WBC # BLD AUTO: 12.69 THOUSAND/UL (ref 4.31–10.16)

## 2023-12-22 PROCEDURE — 36415 COLL VENOUS BLD VENIPUNCTURE: CPT | Performed by: EMERGENCY MEDICINE

## 2023-12-22 PROCEDURE — 80048 BASIC METABOLIC PNL TOTAL CA: CPT | Performed by: EMERGENCY MEDICINE

## 2023-12-22 PROCEDURE — 85027 COMPLETE CBC AUTOMATED: CPT | Performed by: EMERGENCY MEDICINE

## 2023-12-22 RX ORDER — MULTIVIT-MIN/IRON FUM/FOLIC AC 7.5 MG-4
1 TABLET ORAL DAILY
COMMUNITY

## 2023-12-22 RX ORDER — LEVOTHYROXINE SODIUM 112 UG/1
112 TABLET ORAL DAILY
COMMUNITY

## 2023-12-22 RX ORDER — MELOXICAM 15 MG/1
15 TABLET ORAL DAILY
COMMUNITY
Start: 2023-12-20

## 2023-12-22 RX ORDER — ATORVASTATIN CALCIUM 80 MG/1
80 TABLET, FILM COATED ORAL DAILY
COMMUNITY

## 2023-12-22 RX ORDER — MORPHINE SULFATE 10 MG/ML
6 INJECTION, SOLUTION INTRAMUSCULAR; INTRAVENOUS ONCE
Status: COMPLETED | OUTPATIENT
Start: 2023-12-22 | End: 2023-12-22

## 2023-12-22 RX ORDER — GLIMEPIRIDE 4 MG/1
4 TABLET ORAL 3 TIMES DAILY
COMMUNITY

## 2023-12-22 RX ORDER — TIZANIDINE HYDROCHLORIDE 4 MG/1
1 CAPSULE, GELATIN COATED ORAL 3 TIMES DAILY PRN
COMMUNITY
Start: 2023-12-20

## 2023-12-22 RX ORDER — CETIRIZINE HYDROCHLORIDE 10 MG/1
10 TABLET ORAL DAILY
COMMUNITY

## 2023-12-22 RX ADMIN — SODIUM CHLORIDE 150 ML/HR: 0.9 INJECTION, SOLUTION INTRAVENOUS at 03:00

## 2023-12-22 RX ADMIN — VANCOMYCIN HYDROCHLORIDE 1250 MG: 5 INJECTION, POWDER, LYOPHILIZED, FOR SOLUTION INTRAVENOUS at 07:51

## 2023-12-22 RX ADMIN — MORPHINE SULFATE 6 MG: 10 INJECTION INTRAVENOUS at 08:18

## 2023-12-22 RX ADMIN — PIPERACILLIN AND TAZOBACTAM 4.5 G: 36; 4.5 INJECTION, POWDER, FOR SOLUTION INTRAVENOUS at 06:13

## 2023-12-22 NOTE — ED PROCEDURE NOTE
PROCEDURE  CriticalCare Time    Date/Time: 12/21/2023 10:12 PM    Performed by: Krish Locke MD  Authorized by: Krish Locke MD    Critical care provider statement:     Critical care time (minutes):  35    Critical care was necessary to treat or prevent imminent or life-threatening deterioration of the following conditions:  Trauma and metabolic crisis    Critical care was time spent personally by me on the following activities:  Discussions with consultants, evaluation of patient's response to treatment, examination of patient, review of old charts, re-evaluation of patient's condition, ordering and review of radiographic studies, ordering and review of laboratory studies and ordering and performing treatments and interventions  Comments:      Patient with episodes of hypoxia. Hyperglycemic and hyponatremis which needed repeated fluid boluses and IV insulin.  Also requiring repeated finger sticks and blood work.  Abnormal CT findingsd which subsequently led to MRI.  Needing to talk to multiple specialties incliding radiology, neurosurgery and triage MD at [facility].       Krish Locke MD  12/21/23 1909

## 2023-12-22 NOTE — PROGRESS NOTES
Ramón Tang is a 58 y.o. male who is currently ordered Vancomycin IV with management by the Pharmacy Consult service.  Relevant clinical data and objective / subjective history reviewed.  Vancomycin Assessment:  Indication and Goal AUC/Trough: Soft tissue (goal -600, trough >10)  Clinical Status:  new start  Micro:     Renal Function:  SCr: 0.9 mg/dL  CrCl: 118.8 mL/min  Renal replacement: Not on dialysis  Days of Therapy: 1  Current Dose: 1750mg given in ED  Vancomycin Plan:  New Dosinmg IV Q12H  Estimated AUC: 457 mcg*hr/mL  Estimated Trough: 14.8 mcg/mL  Next Level: 23 @ 0600  Renal Function Monitoring: Daily BMP and UOP  Pharmacy will continue to follow closely for s/sx of nephrotoxicity, infusion reactions and appropriateness of therapy.  BMP and CBC will be ordered per protocol. We will continue to follow the patient’s culture results and clinical progress daily.    Marya Crandall, Pharmacist

## 2023-12-22 NOTE — ED NOTES
Pt states he had a reaction to the dilaudid administered earlier in the day for his pain. Pt explains he became feverish and his breathing became fast when he took it. Pt states he would like something else for pain. Provider made aware.     Naomi Sherman RN  12/22/23 0021     77

## 2023-12-22 NOTE — ED NOTES
RANJIT signed by patient at this time. Pt updated on  time for 0730 by [facility] to [facility]. Pt expressed understanding.     Naomi Sherman RN  12/21/23 1954

## 2023-12-22 NOTE — ED NOTES
Pt moved into an inpatient bed for comfort. Pt repositioned, provided clean linens, and pillows. Call bell and urinal in reach. Pt denies any needs or complaints at this time and resting in no distress.     Naomi Sherman RN  12/22/23 4563

## 2023-12-22 NOTE — ED PROVIDER NOTES
Patient taken in signout from Dr. Locke at 1600.  The patient is pending transfer to the hospitalist service at [facility].  I was called by the radiologist who noted that the were adding on findings of left SC joint appearing septic with a fluid collection in it.  I ensured around-the-clock antibiotics were ordered.  Discussed with Dr. Meredith who is the systems triage officer and [facility] transfer center to do, he will update the receiving team with this finding.  Patient remained stable while under my care.  Signed out to Dr. Watkins at 2100.    Patric New

## 2023-12-22 NOTE — ED CARE HANDOFF
Emergency Department Sign Out Note        Sign out and transfer of care from Dr. Watkins. See Separate Emergency Department note.     The patient, Ramón Tang, was evaluated by the previous provider for Septic joint.    Workup Completed:  Labs, imaging     ED Course / Workup Pending (followup):  58 year old M. Awaiting transport to [facility].     Transported by EMS to [facility]. IV morphine administered prior to transport.                                      Procedures  Medical Decision Making  Amount and/or Complexity of Data Reviewed  Labs: ordered.  Radiology: ordered.    Risk  OTC drugs.  Prescription drug management.            Disposition  Final diagnoses:   Hyperglycemia   Hyponatremia   Hypoxia   Traumatic edema of thoracic spinal cord (HCC)   Epidural hematoma (HCC) - Lumbar     Time reflects when diagnosis was documented in both MDM as applicable and the Disposition within this note       Time User Action Codes Description Comment    12/21/2023  9:06 AM Visperas, Krish P Add [R73.9] Hyperglycemia     12/21/2023  9:06 AM Visperas, Krish P Add [E87.1] Hyponatremia     12/21/2023 10:49 AM Visperas, Krish P Add [R09.02] Hypoxia     12/21/2023 11:04 AM Visperas, Krish P Add [S24.0XXA] Traumatic edema of thoracic spinal cord (HCC)     12/21/2023  3:15 PM Visperas, Krish P Add [S06.4XAA] Epidural hematoma (HCC)     12/21/2023  3:23 PM Visperas, Krish P Modify [S06.4XAA] Epidural hematoma (HCC) Lumbar    12/21/2023  8:12 PM Marya Crandall Add [M79.9] Soft tissue disorder           ED Disposition       ED Disposition   Transfer to Another Facility-In Network    Condition   --    Date/Time   Thu Dec 21, 2023 11:05 AM    Comment   Ramón Tang should be transferred out to [facility].               MD Documentation      Flowsheet Row Most Recent Value   Patient Condition The patient has been stabilized such that within reasonable medical probability, no material deterioration of the patient condition or the condition  of the unborn child(william) is likely to result from the transfer   Reason for Transfer Level of Care needed not available at this facility   Benefits of Transfer Specialized equipment and/or services available at the receiving facility (Include comment)________________________   Risks of Transfer Potential for delay in receiving treatment, Potential deterioration of medical condition, Loss of IV, Possible worsening of condition or death during transfer, Increased discomfort during transfer   Accepting Physician Claudia   Accepting Facility Name, [facility]    (Name & Tel number) [facility]   Sending MD Locke   Provider Certification General risk, such as traffic hazards, adverse weather conditions, rough terrain or turbulence, possible failure of equipment (including vehicle or aircraft), or consequences of actions of persons outside the control of the transport personnel, Unanticipated needs of medical equipment and personnel during transport, Risk of worsening condition          RN Documentation      Flowsheet Row Most Recent Value   Accepting Facility Name, [facility]   Bed Assignment Ap 574    (Name & Tel number) [facility]          Follow-up Information    None       Patient's Medications    No medications on file     No discharge procedures on file.       ED Provider  Electronically Signed by     Duong Barcenas DO  12/22/23 [id number]

## 2023-12-22 NOTE — PROGRESS NOTES
Ramón Tang is a 58 y.o. male who is currently ordered Vancomycin IV with management by the Pharmacy Consult service.  Relevant clinical data and objective / subjective history reviewed.  Vancomycin Assessment:  Indication and Goal AUC/Trough: Soft tissue (goal -600, trough >10)  Clinical Status: improving  Micro:     Renal Function:  SCr: 0.84 mg/dL  CrCl: 127 mL/min  Renal replacement: Not on dialysis  Days of Therapy: 2  Current Dose: 1250 mg IV q 12 hrs  Vancomycin Plan:  New Dosing: continue 1250 mg IV q 12 hrs  Estimated AUC: 461 mcg*hr/mL  Estimated Trough: 14.9 mcg/mL  Next Level: 12/23 at 0600  Renal Function Monitoring: Daily BMP and UOP  Pharmacy will continue to follow closely for s/sx of nephrotoxicity, infusion reactions and appropriateness of therapy.  BMP and CBC will be ordered per protocol. We will continue to follow the patient’s culture results and clinical progress daily.    Rik Diane, Pharmacist

## 2023-12-23 LAB
BACTERIA BLD CULT: ABNORMAL
BACTERIA BLD CULT: ABNORMAL
GRAM STN SPEC: ABNORMAL
GRAM STN SPEC: ABNORMAL
S AUREUS DNA BLD POS QL NAA+NON-PROBE: DETECTED